# Patient Record
Sex: MALE
[De-identification: names, ages, dates, MRNs, and addresses within clinical notes are randomized per-mention and may not be internally consistent; named-entity substitution may affect disease eponyms.]

---

## 2018-04-23 ENCOUNTER — HOSPITAL ENCOUNTER (OUTPATIENT)
Dept: HOSPITAL 90 - OIH | Age: 28
Discharge: HOME | End: 2018-04-23
Attending: FAMILY MEDICINE
Payer: COMMERCIAL

## 2018-04-23 DIAGNOSIS — R07.9: ICD-10-CM

## 2018-04-23 DIAGNOSIS — M54.5: Primary | ICD-10-CM

## 2018-04-23 PROCEDURE — 71046 X-RAY EXAM CHEST 2 VIEWS: CPT

## 2018-04-23 PROCEDURE — 72100 X-RAY EXAM L-S SPINE 2/3 VWS: CPT

## 2019-04-08 ENCOUNTER — HOSPITAL ENCOUNTER (OUTPATIENT)
Dept: HOSPITAL 90 - RAH | Age: 29
Discharge: HOME | End: 2019-04-08
Attending: FAMILY MEDICINE
Payer: COMMERCIAL

## 2019-04-08 DIAGNOSIS — K76.0: Primary | ICD-10-CM

## 2019-04-08 PROCEDURE — 76700 US EXAM ABDOM COMPLETE: CPT

## 2020-03-31 ENCOUNTER — HOSPITAL ENCOUNTER (INPATIENT)
Dept: HOSPITAL 90 - EDH | Age: 30
LOS: 2 days | Discharge: HOME | DRG: 103 | End: 2020-04-02
Attending: FAMILY MEDICINE | Admitting: FAMILY MEDICINE
Payer: SELF-PAY

## 2020-03-31 VITALS — SYSTOLIC BLOOD PRESSURE: 142 MMHG | DIASTOLIC BLOOD PRESSURE: 87 MMHG

## 2020-03-31 VITALS — DIASTOLIC BLOOD PRESSURE: 63 MMHG | SYSTOLIC BLOOD PRESSURE: 128 MMHG

## 2020-03-31 DIAGNOSIS — M54.9: ICD-10-CM

## 2020-03-31 DIAGNOSIS — F39: ICD-10-CM

## 2020-03-31 DIAGNOSIS — G89.29: ICD-10-CM

## 2020-03-31 DIAGNOSIS — M79.10: ICD-10-CM

## 2020-03-31 DIAGNOSIS — G43.109: Primary | ICD-10-CM

## 2020-03-31 DIAGNOSIS — F43.22: ICD-10-CM

## 2020-03-31 DIAGNOSIS — E66.9: ICD-10-CM

## 2020-03-31 DIAGNOSIS — Z79.899: ICD-10-CM

## 2020-03-31 DIAGNOSIS — R41.0: ICD-10-CM

## 2020-03-31 LAB
ALBUMIN SERPL-MCNC: 3.8 G/DL (ref 3.5–5)
ALT SERPL-CCNC: 31 U/L (ref 12–78)
AMPHETAMINES UR QL SCN: NEGATIVE
AST SERPL-CCNC: 17 U/L (ref 10–37)
BARBITURATES UR QL SCN: NEGATIVE
BASOPHILS NFR BLD AUTO: 0.4 % (ref 0–5)
BENZODIAZ UR QL SCN: NEGATIVE
BILIRUB SERPL-MCNC: 0.4 MG/DL (ref 0.2–1)
BUN SERPL-MCNC: 14 MG/DL (ref 7–18)
BZE UR QL SCN: NEGATIVE
CANNABINOIDS UR QL SCN: NEGATIVE
CHLORIDE SERPL-SCNC: 99 MMOL/L (ref 101–111)
CO2 SERPL-SCNC: 28 MMOL/L (ref 21–32)
CREAT SERPL-MCNC: 1 MG/DL (ref 0.5–1.5)
EOSINOPHIL NFR BLD AUTO: 0.3 % (ref 0–8)
ERYTHROCYTE [DISTWIDTH] IN BLOOD BY AUTOMATED COUNT: 13 % (ref 11–15.5)
GFR SERPL CREATININE-BSD FRML MDRD: 93 ML/MIN (ref 60–?)
GLUCOSE SERPL-MCNC: 134 MG/DL (ref 70–105)
HCT VFR BLD AUTO: 46.6 % (ref 42–54)
LYMPHOCYTES NFR SPEC AUTO: 18.6 % (ref 21–51)
MCH RBC QN AUTO: 28.1 PG (ref 27–33)
MCHC RBC AUTO-ENTMCNC: 33.9 G/DL (ref 32–36)
MCV RBC AUTO: 82.9 FL (ref 79–99)
MONOCYTES NFR BLD AUTO: 6.1 % (ref 3–13)
NEUTROPHILS NFR BLD AUTO: 74.1 % (ref 40–77)
NRBC BLD MANUAL-RTO: 0 % (ref 0–0.19)
OPIATES UR QL SCN: NEGATIVE
PCP UR QL SCN: NEGATIVE
PH UR STRIP: 6.5 [PH] (ref 5–8)
PLATELET # BLD AUTO: 291 K/UL (ref 130–400)
POTASSIUM SERPL-SCNC: 3.2 MMOL/L (ref 3.5–5.1)
PROT SERPL-MCNC: 8.9 G/DL (ref 6–8.3)
RBC # BLD AUTO: 5.62 MIL/UL (ref 4.5–6.2)
RBC #/AREA URNS HPF: (no result) /HPF (ref 0–1)
SODIUM SERPL-SCNC: 135 MMOL/L (ref 136–145)
SP GR UR STRIP: 1.02 (ref 1–1.03)
UROBILINOGEN UR STRIP-MCNC: 1 MG/DL (ref 0.2–1)
WBC # BLD AUTO: 13.6 K/UL (ref 4.8–10.8)
WBC #/AREA URNS HPF: (no result) /HPF (ref 0–1)

## 2020-03-31 PROCEDURE — 81001 URINALYSIS AUTO W/SCOPE: CPT

## 2020-03-31 PROCEDURE — 93005 ELECTROCARDIOGRAM TRACING: CPT

## 2020-03-31 PROCEDURE — 85025 COMPLETE CBC W/AUTO DIFF WBC: CPT

## 2020-03-31 PROCEDURE — 36415 COLL VENOUS BLD VENIPUNCTURE: CPT

## 2020-03-31 PROCEDURE — 70498 CT ANGIOGRAPHY NECK: CPT

## 2020-03-31 PROCEDURE — 72131 CT LUMBAR SPINE W/O DYE: CPT

## 2020-03-31 PROCEDURE — 82948 REAGENT STRIP/BLOOD GLUCOSE: CPT

## 2020-03-31 PROCEDURE — 70551 MRI BRAIN STEM W/O DYE: CPT

## 2020-03-31 PROCEDURE — 87040 BLOOD CULTURE FOR BACTERIA: CPT

## 2020-03-31 PROCEDURE — 70496 CT ANGIOGRAPHY HEAD: CPT

## 2020-03-31 PROCEDURE — 80053 COMPREHEN METABOLIC PANEL: CPT

## 2020-03-31 PROCEDURE — 84132 ASSAY OF SERUM POTASSIUM: CPT

## 2020-03-31 PROCEDURE — 80305 DRUG TEST PRSMV DIR OPT OBS: CPT

## 2020-03-31 PROCEDURE — 85610 PROTHROMBIN TIME: CPT

## 2020-03-31 PROCEDURE — 85730 THROMBOPLASTIN TIME PARTIAL: CPT

## 2020-03-31 NOTE — NUR
AD SHIRLEY

Pt up ad shirley to bathroom with assistance.He said he got dizzy for a little bit.No agitation 
noted.

## 2020-03-31 NOTE — NUR
PATIENT RECEIVED FROM ANNY, REPORT RECEIVED BY CODY STEPHENS. PATIENT IS DROWSY, SLOW TO ANSWER 
QUESTIONS. HE IS ALERT TO SELF, DATE,  AND PLACE. PATIENT VOICED HE IS A . 
PATIENT NOTED TO BE VERY UNSTEADY WHILE STANDING, AT HIGH RISK FOR FALLS AND INJURY. PATIENT 
IS ABLE TO ANSWER THAT HE IS IN [facility] AND THAT HE CAME IN  D/T SEVERE HEADACHE. 
I SPOKE WITH PT'S FATHER AND HE STATES CURRENT MOOD AND STATUS IS NOT HIS NORMAL. POC 
DISCUSSED WITH HIM. CODY STEPHENS, DID SPEAK WITH DR. KEENE AND HE GAVE HIS RECOMMENDATIONS. 
PATIENT DID GET UP ON HIS OWN AND SAT AT SIDE OF BED. I INSTRUCTED HIM NOT TO GET UP D/T 
HEAVILY MEDICATED. HE VOICED "OKAY". PATIENT MOVED TO ROOM 425 FOR CLOSER SUPERVISION. WILL 
CONT TO MONITOR CLOSELY. 

-------------------------------------------------------------------------------

Addendum: 20 at  by SUN HORTA RN RN

-------------------------------------------------------------------------------

Amended: Links added.

## 2020-03-31 NOTE — NUR
ASSUMED CARE

Received report from Robbi Mclaughlin Rn.Pt transferred from [facility] via wheelchair,pt appears 
drowsy,oriented x 2.Reoriented to time and plan of care.1:1 sitter at bedside.Iv wrapped 
with non elastic bandage,apparently pt had pulled out his iv numerous times.

-------------------------------------------------------------------------------

Addendum: 03/31/20 at 2157 by AUSTIN EVANS RN RN

-------------------------------------------------------------------------------

Amended: Links added.

## 2020-03-31 NOTE — NUR
PATIENT WAS TRANSFERRED TO ROOM 332 FOR 1:1 SUPERVISION. I HAD SPOKEN TO PT'S SPOUSE VIA 
TELEPHONE AND OBTAINED ADMISSION INFORMATION FROM HER. SHE WAS ADVISED HE WAS TO BE MOVED 
FOR CLOSER SUPERVISION. BED SIDE REPORT WAS GIVEN TO CHAD STEPHENS.

## 2020-04-01 VITALS — DIASTOLIC BLOOD PRESSURE: 61 MMHG | SYSTOLIC BLOOD PRESSURE: 123 MMHG

## 2020-04-01 VITALS — DIASTOLIC BLOOD PRESSURE: 63 MMHG | SYSTOLIC BLOOD PRESSURE: 122 MMHG

## 2020-04-01 VITALS — SYSTOLIC BLOOD PRESSURE: 130 MMHG | DIASTOLIC BLOOD PRESSURE: 62 MMHG

## 2020-04-01 VITALS — SYSTOLIC BLOOD PRESSURE: 138 MMHG | DIASTOLIC BLOOD PRESSURE: 62 MMHG

## 2020-04-01 VITALS — SYSTOLIC BLOOD PRESSURE: 115 MMHG | DIASTOLIC BLOOD PRESSURE: 69 MMHG

## 2020-04-01 VITALS — DIASTOLIC BLOOD PRESSURE: 69 MMHG | SYSTOLIC BLOOD PRESSURE: 126 MMHG

## 2020-04-01 RX ADMIN — POTASSIUM CHLORIDE PRN MEQ: 1500 TABLET, EXTENDED RELEASE ORAL at 19:25

## 2020-04-01 RX ADMIN — POTASSIUM CHLORIDE PRN MEQ: 1500 TABLET, EXTENDED RELEASE ORAL at 23:09

## 2020-04-01 RX ADMIN — GABAPENTIN SCH MG: 100 CAPSULE ORAL at 16:24

## 2020-04-01 RX ADMIN — POTASSIUM CHLORIDE PRN MEQ: 1500 TABLET, EXTENDED RELEASE ORAL at 11:08

## 2020-04-01 RX ADMIN — GABAPENTIN SCH MG: 100 CAPSULE ORAL at 20:22

## 2020-04-01 RX ADMIN — PANTOPRAZOLE SODIUM SCH MG: 40 TABLET, DELAYED RELEASE ORAL at 11:07

## 2020-04-01 NOTE — NUR
INITIAL

SW spoke with patient. He states he lives with spouse, Lupe Meza, [phone].  No home 
services or DME.  Patient is presently employed full time.  He is able to complete ADL's and 
drive.  PCP is Dr. Phillip Rodriguez. Pharmacy is [facility] located on [facility] in [place].  DCP 
is home. 

-------------------------------------------------------------------------------

Addendum: 04/01/20 at 1158 by GERARDO HERNANDEZ SS

-------------------------------------------------------------------------------

Amended: Links added.

## 2020-04-01 NOTE — NUR
received call from lab of K of 3.0,  i called dr SANDY Rodriguez and received order for potassium 
protocol;  i also informed him that dr Lee has come to see the patient and i has ordered 
ct of head and neck;  he stated that was good.

## 2020-04-01 NOTE — NUR
Chart reviewed-

Including  ER MD notes.

Text to Dr. Rodriguez re: plan of care. Requesting  poss Neuro consult? Awaiting call back. [facility] 
form created and uploaded, CM interventions documented 

-------------------------------------------------------------------------------

Addendum: 04/01/20 at 0903 by NAIN PETIT RN CM

-------------------------------------------------------------------------------

Amended: Links added.

## 2020-04-02 VITALS — SYSTOLIC BLOOD PRESSURE: 124 MMHG | DIASTOLIC BLOOD PRESSURE: 64 MMHG

## 2020-04-02 LAB
ALBUMIN SERPL-MCNC: 3.4 G/DL (ref 3.5–5)
ALT SERPL-CCNC: 28 U/L (ref 12–78)
APTT PPP: 22.9 SEC (ref 26.3–35.5)
AST SERPL-CCNC: 12 U/L (ref 10–37)
BASOPHILS NFR BLD AUTO: 0.5 % (ref 0–5)
BILIRUB SERPL-MCNC: 0.3 MG/DL (ref 0.2–1)
BUN SERPL-MCNC: 14 MG/DL (ref 7–18)
CHLORIDE SERPL-SCNC: 104 MMOL/L (ref 101–111)
CO2 SERPL-SCNC: 27 MMOL/L (ref 21–32)
CREAT SERPL-MCNC: 1 MG/DL (ref 0.5–1.5)
EOSINOPHIL NFR BLD AUTO: 0.8 % (ref 0–8)
ERYTHROCYTE [DISTWIDTH] IN BLOOD BY AUTOMATED COUNT: 13.2 % (ref 11–15.5)
GFR SERPL CREATININE-BSD FRML MDRD: 93 ML/MIN (ref 60–?)
GLUCOSE SERPL-MCNC: 90 MG/DL (ref 70–105)
HCT VFR BLD AUTO: 45 % (ref 42–54)
INR PPP: 1 (ref 0.85–1.15)
LYMPHOCYTES NFR SPEC AUTO: 31.3 % (ref 21–51)
MCH RBC QN AUTO: 27.7 PG (ref 27–33)
MCHC RBC AUTO-ENTMCNC: 32.9 G/DL (ref 32–36)
MCV RBC AUTO: 84.3 FL (ref 79–99)
MONOCYTES NFR BLD AUTO: 9.2 % (ref 3–13)
NEUTROPHILS NFR BLD AUTO: 57.6 % (ref 40–77)
NRBC BLD MANUAL-RTO: 0 % (ref 0–0.19)
PLATELET # BLD AUTO: 286 K/UL (ref 130–400)
POTASSIUM SERPL-SCNC: 3.6 MMOL/L (ref 3.5–5.1)
PROT SERPL-MCNC: 7.7 G/DL (ref 6–8.3)
PROTHROMBIN TIME: 10.8 SEC (ref 9.6–11.6)
RBC # BLD AUTO: 5.34 MIL/UL (ref 4.5–6.2)
SODIUM SERPL-SCNC: 139 MMOL/L (ref 136–145)
WBC # BLD AUTO: 10.6 K/UL (ref 4.8–10.8)

## 2020-04-02 RX ADMIN — POTASSIUM CHLORIDE PRN MEQ: 1500 TABLET, EXTENDED RELEASE ORAL at 09:49

## 2020-04-02 RX ADMIN — GABAPENTIN SCH MG: 100 CAPSULE ORAL at 15:29

## 2020-04-02 RX ADMIN — PANTOPRAZOLE SODIUM SCH MG: 40 TABLET, DELAYED RELEASE ORAL at 09:49

## 2020-04-02 RX ADMIN — GABAPENTIN SCH MG: 100 CAPSULE ORAL at 09:49

## 2020-04-02 RX ADMIN — POTASSIUM CHLORIDE PRN MEQ: 1500 TABLET, EXTENDED RELEASE ORAL at 05:26

## 2020-04-02 RX ADMIN — POTASSIUM CHLORIDE PRN MEQ: 1500 TABLET, EXTENDED RELEASE ORAL at 15:30

## 2020-04-02 NOTE — NUR
dr maryann rice here to see patient;  i informed him pt has had no disruptive episodes since 
arrival to the floor and his headache is intemittent and mild since arrival;  he is aaox3 
and neuro work up showed no abnormalities;  he stated ok to cancel the tx tropical referral 
and d/c the patient home;  he wrote script for neurontin per dr sweeney's recomendations.

## 2020-04-02 NOTE — NUR
pt's ride not available till now to take him home;  security was not available to bring up 
his belongings till 1800

## 2020-04-02 NOTE — NUR
pt stated understanding of all d/c instructions on dx headache and fact that he is to do 
tele communication call monday 4-6-2020 w/ dr eda rice office;  iv access removed, i called 
security earlier for pt belongings;  pt had not left as of yet waiting for his ride to be 
off work;  .

## 2020-04-10 ENCOUNTER — HOSPITAL ENCOUNTER (EMERGENCY)
Dept: HOSPITAL 90 - EDH | Age: 30
Discharge: HOME | End: 2020-04-10
Payer: COMMERCIAL

## 2020-04-10 DIAGNOSIS — G43.809: Primary | ICD-10-CM

## 2020-04-10 DIAGNOSIS — R41.82: ICD-10-CM

## 2020-04-10 DIAGNOSIS — E78.00: ICD-10-CM

## 2020-04-10 DIAGNOSIS — Z88.0: ICD-10-CM

## 2020-04-10 DIAGNOSIS — Z79.899: ICD-10-CM

## 2020-04-10 LAB
ALBUMIN SERPL-MCNC: 3.8 G/DL (ref 3.5–5)
ALT SERPL-CCNC: 55 U/L (ref 12–78)
AMPHETAMINES UR QL SCN: NEGATIVE
AST SERPL-CCNC: 20 U/L (ref 10–37)
BARBITURATES UR QL SCN: NEGATIVE
BASOPHILS NFR BLD AUTO: 0.4 % (ref 0–5)
BENZODIAZ UR QL SCN: NEGATIVE
BILIRUB SERPL-MCNC: 0.6 MG/DL (ref 0.2–1)
BUN SERPL-MCNC: 11 MG/DL (ref 7–18)
BZE UR QL SCN: NEGATIVE
CANNABINOIDS UR QL SCN: NEGATIVE
CHLORIDE SERPL-SCNC: 101 MMOL/L (ref 101–111)
CO2 SERPL-SCNC: 27 MMOL/L (ref 21–32)
CREAT SERPL-MCNC: 1.1 MG/DL (ref 0.5–1.5)
EOSINOPHIL NFR BLD AUTO: 2.2 % (ref 0–8)
ERYTHROCYTE [DISTWIDTH] IN BLOOD BY AUTOMATED COUNT: 13.1 % (ref 11–15.5)
GFR SERPL CREATININE-BSD FRML MDRD: 84 ML/MIN (ref 60–?)
GLUCOSE SERPL-MCNC: 124 MG/DL (ref 70–105)
HCT VFR BLD AUTO: 46.7 % (ref 42–54)
LYMPHOCYTES NFR SPEC AUTO: 34.7 % (ref 21–51)
MCH RBC QN AUTO: 27.3 PG (ref 27–33)
MCHC RBC AUTO-ENTMCNC: 32.5 G/DL (ref 32–36)
MCV RBC AUTO: 84 FL (ref 79–99)
MONOCYTES NFR BLD AUTO: 8.3 % (ref 3–13)
NEUTROPHILS NFR BLD AUTO: 54 % (ref 40–77)
NRBC BLD MANUAL-RTO: 0 % (ref 0–0.19)
OPIATES UR QL SCN: NEGATIVE
PCP UR QL SCN: NEGATIVE
PLATELET # BLD AUTO: 282 K/UL (ref 130–400)
POTASSIUM SERPL-SCNC: 3.6 MMOL/L (ref 3.5–5.1)
PROT SERPL-MCNC: 8.3 G/DL (ref 6–8.3)
RBC # BLD AUTO: 5.56 MIL/UL (ref 4.5–6.2)
SODIUM SERPL-SCNC: 139 MMOL/L (ref 136–145)
WBC # BLD AUTO: 14.5 K/UL (ref 4.8–10.8)

## 2020-04-10 PROCEDURE — 93005 ELECTROCARDIOGRAM TRACING: CPT

## 2020-04-10 PROCEDURE — 96375 TX/PRO/DX INJ NEW DRUG ADDON: CPT

## 2020-04-10 PROCEDURE — 82948 REAGENT STRIP/BLOOD GLUCOSE: CPT

## 2020-04-10 PROCEDURE — 80053 COMPREHEN METABOLIC PANEL: CPT

## 2020-04-10 PROCEDURE — 85025 COMPLETE CBC W/AUTO DIFF WBC: CPT

## 2020-04-10 PROCEDURE — 70450 CT HEAD/BRAIN W/O DYE: CPT

## 2020-04-10 PROCEDURE — 36415 COLL VENOUS BLD VENIPUNCTURE: CPT

## 2020-04-10 PROCEDURE — 96374 THER/PROPH/DIAG INJ IV PUSH: CPT

## 2020-04-10 PROCEDURE — 96361 HYDRATE IV INFUSION ADD-ON: CPT

## 2020-04-10 PROCEDURE — 80305 DRUG TEST PRSMV DIR OPT OBS: CPT

## 2024-08-28 ENCOUNTER — HOSPITAL ENCOUNTER (EMERGENCY)
Dept: HOSPITAL 90 - EDH | Age: 34
Discharge: HOME | End: 2024-08-28
Payer: SELF-PAY

## 2024-08-28 VITALS
SYSTOLIC BLOOD PRESSURE: 124 MMHG | DIASTOLIC BLOOD PRESSURE: 60 MMHG | HEART RATE: 70 BPM | OXYGEN SATURATION: 99 % | RESPIRATION RATE: 16 BRPM

## 2024-08-28 VITALS — WEIGHT: 315 LBS | BODY MASS INDEX: 41.75 KG/M2 | HEIGHT: 73 IN

## 2024-08-28 DIAGNOSIS — Z88.0: ICD-10-CM

## 2024-08-28 DIAGNOSIS — S33.5XXA: Primary | ICD-10-CM
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
## 2024-08-28 DIAGNOSIS — Z79.899: ICD-10-CM
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
## 2024-08-28 DIAGNOSIS — Z88.8: ICD-10-CM
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
## 2024-08-28 PROCEDURE — 96372 THER/PROPH/DIAG INJ SC/IM: CPT

## 2024-08-28 PROCEDURE — 99284 EMERGENCY DEPT VISIT MOD MDM: CPT

## 2024-08-28 RX ADMIN — ORPHENADRINE CITRATE ONE MG: 30 INJECTION INTRAMUSCULAR; INTRAVENOUS at 13:51

## 2024-08-28 RX ADMIN — KETOROLAC TROMETHAMINE ONE MG: 30 INJECTION, SOLUTION INTRAMUSCULAR; INTRAVENOUS at 13:51

## 2024-08-28 RX ADMIN — TRIAMCINOLONE ACETONIDE ONE MG: 40 INJECTION, SUSPENSION INTRA-ARTICULAR; INTRAMUSCULAR at 13:51
